# Patient Record
Sex: FEMALE | Race: WHITE | Employment: UNEMPLOYED | URBAN - METROPOLITAN AREA
[De-identification: names, ages, dates, MRNs, and addresses within clinical notes are randomized per-mention and may not be internally consistent; named-entity substitution may affect disease eponyms.]

---

## 2017-04-26 ENCOUNTER — OFFICE VISIT (OUTPATIENT)
Dept: MIDWIFE SERVICES | Age: 32
End: 2017-04-26

## 2017-04-26 VITALS
WEIGHT: 144 LBS | DIASTOLIC BLOOD PRESSURE: 63 MMHG | HEART RATE: 79 BPM | SYSTOLIC BLOOD PRESSURE: 99 MMHG | BODY MASS INDEX: 22.55 KG/M2

## 2017-04-26 DIAGNOSIS — R10.2 PELVIC PAIN IN FEMALE: Primary | ICD-10-CM

## 2017-04-26 NOTE — PROGRESS NOTES
Ramses Nichols presents for problem visit with pelvic pain with c/o pelvic discomfort and throbbing in internal pelvic area 3 and 4 days ago with internal discomfort when sitting with legs crossed and squatting. Denies Pain or throbbing for past 2 days. No menses for past 16 months due to pregnancy and child birth. Currently nursing. Did not have post partum visit. Had laceration with repair and denies any issues. No painful intercourse. Denies  fever, chills, bowel habits or constipation. Pelvic exam: VULVA: normal appearing vulva with no masses, tenderness or lesions, VAGINA: normal appearing vagina with normal color and discharge, no lesions, CERVIX: normal appearing cervix without discharge or lesions, UTERUS: uterus is normal size, shape, consistency and nontender, retroverted. ADNEXA: normal adnexa in size, nontender and no masses, RECTAL:rectal exam not indicated. Large amount of stool palpated through pelvic floor. A: Normal pelvic exam. No Pain or throbbing illicited with exam  Suspect constipation. Poor tone with kegel demonstration.     P: RTO in 2 weeks for  Well woman exam and pap smear  If pain/throbbing returns will order pelvic ultrasound  Increase oral fluid and good bowel routine to decrease constipation

## 2017-04-26 NOTE — PROGRESS NOTES
Chief Complaint   Patient presents with    Well Woman     Throbbing pain in pelvis. Visit Vitals    BP 99/63    Pulse 79    Wt 144 lb (65.3 kg)    Breastfeeding Yes    BMI 22.55 kg/m2     1. Have you been to the ER, urgent care clinic since your last visit? Hospitalized since your last visit? No    2. Have you seen or consulted any other health care providers outside of the [address] [place] [address] since your last visit? Include any pap smears or colon screening.  No

## 2017-04-26 NOTE — MR AVS SNAPSHOT
Visit Information Date & Time Provider Department Dept. Phone Encounter #  
 4/26/2017  3:00 PM Collette RidkaleKisha 6 [id number] Your Appointments 5/4/2017  3:30 PM  
PROBLEM VISIT with Collette Rideau, CNM [address] [facility] ([facility]) Appt Note: f/u  
 [address] [facility] [phone] [facility]  
[phone]  
  
   
 [address] [facility] [address] [address] Upcoming Health Maintenance Date Due  
 PAP AKA CERVICAL CYTOLOGY 7/23/2016 Allergies as of 4/26/2017  Review Complete On: 10/3/2016 By: Kristen Grijalva CNM No Known Allergies Current Immunizations  Never Reviewed Name Date Tdap 10/3/2016  9:35 AM  
  
 Not reviewed this visit Vitals BP Pulse Weight(growth percentile) Breastfeeding? BMI OB Status 99/63 79 144 lb (65.3 kg) Yes 22.55 kg/m2 Recent pregnancy Smoking Status Never Smoker BMI and BSA Data Body Mass Index Body Surface Area  
 22.55 kg/m 2 1.76 m 2 Preferred Pharmacy Pharmacy Name Phone [facility] [address] [facility] Your Updated Medication List  
  
   
This list is accurate as of: 4/26/17  3:49 PM.  Always use your most recent med list.  
  
  
  
  
 PRENATAL DHA+COMPLETE PRENATAL -300 mg-mcg-mg Cmpk Generic drug:  PNV no.24-iron-folic acid-dha Take  by mouth. PROBIOTIC 4X 10-15 mg Tbec Generic drug:  B.infantis-B.ani-B.long-B.bifi Take  by mouth. VITAMIN D2 50,000 unit capsule Generic drug:  ergocalciferol Take 50,000 Units by mouth. VITAMIN D3 1,000 unit Cap Generic drug:  cholecalciferol Take  by mouth. Introducing [facility]! [facility] introduces [employer] patient portal. Now you can access parts of your medical record, email your doctor's office, and request medication refills online. 1. In your internet browser, go to [email or web address] 2. Click on the First Time User? Click Here link in the Sign In box. You will see the New Member Sign Up page. 3. Enter your POWWOW Access Code exactly as it appears below. You will not need to use this code after youve completed the sign-up process. If you do not sign up before the expiration date, you must request a new code. · POWWOW Access Code: [email or web address] Expires: 7/25/2017  3:49 PM 
 
4. Enter the last four digits of your Social Security Number (xxxx) and Date of Birth (mm/dd/yyyy) as indicated and click Submit. You will be taken to the next sign-up page. 5. Create a Voddlert ID. This will be your POWWOW login ID and cannot be changed, so think of one that is secure and easy to remember. 6. Create a POWWOW password. You can change your password at any time. 7. Enter your Password Reset Question and Answer. This can be used at a later time if you forget your password. 8. Enter your e-mail address. You will receive e-mail notification when new information is available in [address] 9. Click Sign Up. You can now view and download portions of your medical record. 10. Click the Download Summary menu link to download a portable copy of your medical information. If you have questions, please visit the Frequently Asked Questions section of the POWWOW website. Remember, POWWOW is NOT to be used for urgent needs. For medical emergencies, dial 911. Now available from your iPhone and Android! Please provide this summary of care documentation to your next provider. Your primary care clinician is listed as NONE. If you have any questions after today's visit, please call [phone].

## 2017-05-04 ENCOUNTER — HOSPITAL ENCOUNTER (OUTPATIENT)
Dept: LAB | Age: 32
Discharge: HOME | End: 2017-05-04
Payer: COMMERCIAL

## 2017-05-04 ENCOUNTER — OFFICE VISIT (OUTPATIENT)
Dept: MIDWIFE SERVICES | Age: 32
End: 2017-05-04

## 2017-05-04 VITALS
DIASTOLIC BLOOD PRESSURE: 59 MMHG | HEART RATE: 76 BPM | BODY MASS INDEX: 22.34 KG/M2 | WEIGHT: 142.31 LBS | SYSTOLIC BLOOD PRESSURE: 103 MMHG | HEIGHT: 67 IN

## 2017-05-04 DIAGNOSIS — Z01.419 WELL WOMAN EXAM WITH ROUTINE GYNECOLOGICAL EXAM: Primary | ICD-10-CM

## 2017-05-04 PROCEDURE — 88175 CYTOPATH C/V AUTO FLUID REDO: CPT | Performed by: ADVANCED PRACTICE MIDWIFE

## 2017-05-04 NOTE — PROGRESS NOTES
Chief Complaint   Patient presents with    Well Woman     Visit Vitals    /59    Pulse 76    Ht 5' 7\" (1.702 m)    Wt 142 lb 5 oz (64.6 kg)    Breastfeeding Yes    BMI 22.29 kg/m2     1. Have you been to the ER, urgent care clinic since your last visit? Hospitalized since your last visit? No    2. Have you seen or consulted any other health care providers outside of the [address] [place] [address] since your last visit? Include any pap smears or colon screening.  No

## 2017-05-04 NOTE — PROGRESS NOTES
SUBJECTIVE:   [age] [facility] y.o. female for annual routine Pap and checkup. Current Outpatient Prescriptions   Medication Sig Dispense Refill    PNV no.24-iron-folic acid-dha (PRENATAL DHA+COMPLETE PRENATAL) -300 mg-mcg-mg cmpk Take  by mouth.  B.infantis-B.ani-B.long-B.bifi (PROBIOTIC 4X) 10-15 mg TbEC Take  by mouth.  Cholecalciferol, Vitamin D3, (VITAMIN D3) 1,000 unit cap Take  by mouth. Allergies: Review of patient's allergies indicates no known allergies. No LMP recorded. Patient is breast feeding. ROS:  Complete ROS completed, see second page of personal health questionnaire scanned in under media tab. Feeling well. No dyspnea or chest pain on exertion. No abdominal pain, change in bowel habits, black or bloody stools. No urinary tract symptoms. GYN ROS: No menses due to nursing 11 month old baby, no abnormal bleeding, pelvic pain or discharge, no breast pain or new or enlarging lumps on self exam. No neurological complaints. No monthly menses monthly. No bleeding between menses, or after intercourse. Experiences no dysmenorrhea when cycling since childbirth. Personal Health Questionnaire Reviewed: yes. Please see under chart review, media tab. History fully reviewed- see chart    See patient intake form for complete ROS, scanned in pt chart, under media tab. Age 5 to 33 yo, received HPV vaccine series, n/a.    USPFT recommendations: reviewed. BMI 30 kg/m2 or greater no. College bound no. Childbearing age yes. Hyperlipidemia or other known risk factors for cardiovascular and diet-related chronic disease no. Born between [facility] no. Age 8 to 24 yo and fair-skinned no. Age 15 to 24 yo no. Age 25 or older: yes. Over the past 2 weeks, have you felt down, depressed, or hopeless? no  Over the past 2 weeks, have you felt little interest or pleasure in doing things? no  Age 20 yo or older and at risk for coronary heart disease no. Age 35 yo or older no.   Age 47 yo or older no. Leida 53 yo to 79 yo no. Age 73 yo or older no. Family history of Breast Cancer no. Sexually active age up to age 26 yo or at high risk for STI no.  Sustained /80 or greater no. Tobacco use no. OBJECTIVE:   The patient appears well, alert, oriented x 3, in no distress. Visit Vitals    /59    Pulse 76    Ht 5' 7\" (1.702 m)    Wt 142 lb 5 oz (64.6 kg)    Breastfeeding Yes    BMI 22.29 kg/m2     Neck supple. No adenopathy or thyromegaly. Lungs are clear, good air entry, no wheezes, rhonchi or rales. S1 and S2 normal, no murmurs, regular rate and rhythm. Abdomen soft without tenderness, guarding, mass or organomegaly. Extremities show no edema. Neurological is normal, no focal findings. BREAST EXAM: breasts appear normal, no suspicious masses, no skin or nipple changes or axillary nodes, lactating, no erythema or tenderness, nipples normal    PELVIC EXAM: VULVA: normal appearing vulva with no masses, tenderness or lesions, VAGINA: normal appearing vagina with normal color and discharge, no lesions, CERVIX: normal appearing cervix without discharge or lesions, cervical motion tenderness absent, multiparous os, UTERUS: uterus is normal size, shape, consistency and nontender, anteverted, mobile, ADNEXA: normal adnexa in size, nontender and no masses, PAP: Pap smear done today, thin-prep method, HPV test   Pap smear collected    ASSESSMENT:   well woman    PLAN:  Counseled on birth control options and declines at this time. Pt instructed on heart health for women, calcium and vitamin D intake, and female cancers;   Return annually or prn

## 2019-06-13 ENCOUNTER — TELEPHONE (OUTPATIENT)
Dept: OBGYN CLINIC | Age: 34
End: 2019-06-13

## 2019-06-13 NOTE — TELEPHONE ENCOUNTER
1)  Checking to see if we ever carla cholesterol tests while she was pregnant- NO, not commonly run in pregnancy as a lab    2)  Checking to see if we can draw cholesterol for her. Advised to speak with [facility] when she comes in for her OV soon but typically a PCP usually does.

## 2019-06-18 ENCOUNTER — OFFICE VISIT (OUTPATIENT)
Dept: OBGYN CLINIC | Age: 34
End: 2019-06-18

## 2019-06-18 VITALS
BODY MASS INDEX: 22.76 KG/M2 | WEIGHT: 145 LBS | DIASTOLIC BLOOD PRESSURE: 80 MMHG | SYSTOLIC BLOOD PRESSURE: 118 MMHG | HEIGHT: 67 IN

## 2019-06-18 DIAGNOSIS — Z01.419 WELL WOMAN EXAM WITH ROUTINE GYNECOLOGICAL EXAM: ICD-10-CM

## 2019-06-18 DIAGNOSIS — N92.0 MENORRHAGIA WITH REGULAR CYCLE: Primary | ICD-10-CM

## 2019-06-18 NOTE — PROGRESS NOTES
Chief Complaint   Patient presents with    Well Woman     Visit Vitals  /80   Wt 145 lb (65.8 kg)   LMP 06/16/2019 (Exact Date)   Breastfeeding? Yes Comment: weaning process right now   BMI 22.71 kg/m²     1. Have you been to the ER, urgent care clinic since your last visit? Hospitalized since your last visit? No    2. Have you seen or consulted any other health care providers outside of the [address] [place] [address] since your last visit? Include any pap smears or colon screening. No     Here today for well woman exam.  She has no complaints or concerns today.

## 2019-06-18 NOTE — PROGRESS NOTES
Annual exam ages 21-44    Funmi Ching is a ,  27 y.o. female OTHER Patient's last menstrual period was 2019 (exact date). .    She presents for her annual checkup. She is having no significant problems except heavier cycles since starting cycles again since nursing after last baby. With regard to the Gardasil vaccine, she is older than the FDA approved age to receive it. Menstrual status:    Her periods are heavy in flow. She is using five to ten pads or tampons per day, usually regular and last 26-30 days. She has mild dysmenorrhea. She reports some mild premenstrual symptoms. Contraception:    The current method of family planning is condoms always. Sexual history:     She  has intercourse with males only. Medical conditions:    Since her last annual GYN exam about two years ago, she has not the following changes in her health history: none. Her brother was diagnosed with high cholesterol and she was told her's was high as a child and would like her's tested today. She has not been fasting and was told it might be a minimally higher with a non-fasting sample. Pap and Mammogram History:    Her most recent Pap smear was normal, obtained 2 year(s) ago. The patient has never had a mammogram.    Breast Cancer History:denies except in distant relative  Substance Abuse history denies    Past Medical History:   Diagnosis Date    Anemia     Postpartum depression     hormonal shift with weaning at 1 yoa     History reviewed. No pertinent surgical history. Allergies: Patient has no known allergies. Tobacco History:  reports that she has never smoked. She does not have any smokeless tobacco history on file. Alcohol Abuse:  reports that she does not drink alcohol. Drug Abuse:  reports that she does not use drugs.     Family Medical/Cancer History:   Family History   Problem Relation Age of Onset    Cancer Maternal Grandmother         Brain    Breast Cancer Other Great Aunt-Moms side Breast CAncer    Diabetes Paternal Grandfather         type 2    Heart Disease Paternal Grandfather     Heart Disease Maternal Grandfather         Review of Systems - History obtained from the patient  Constitutional: negative for weight loss, fever, night sweats  HEENT: negative for hearing loss, earache, congestion, snoring, sorethroat  CV: negative for chest pain, palpitations, edema  Resp: negative for cough, shortness of breath, wheezing  GI: negative for change in bowel habits, abdominal pain, black or bloody stools  : negative for frequency, dysuria, hematuria, vaginal discharge  MSK: negative for back pain, joint pain, muscle pain  Breast: negative for breast lumps, nipple discharge, galactorrhea  Skin :negative for itching, rash, hives  Neuro: negative for dizziness, headache, confusion, weakness  Psych: negative for anxiety, depression, change in mood  Heme/lymph: negative for bleeding, bruising, pallor    Physical Exam    Visit Vitals  /80   Ht 5' 7\" (1.702 m)   Wt 145 lb (65.8 kg)   LMP 06/16/2019 (Exact Date)   Breastfeeding? no Comment: weaning process right now   BMI 22.71 kg/m²       Constitutional  · Appearance: well-nourished, well developed, alert, in no acute distress    HENT  · Head and Face: appears normal    Neck  · Inspection/Palpation: normal appearance, no masses or tenderness  · Lymph Nodes: no lymphadenopathy present  · Thyroid: gland size normal, nontender, no nodules or masses present on palpation    Chest  · Respiratory Effort: breathing unlabored  · Auscultation: normal breath sounds    Cardiovascular  · Heart:  · Auscultation: regular rate and rhythm without murmur    Breasts  · Inspection of Breasts: breasts symmetrical, no skin changes, no discharge present, nipple appearance normal, no skin retraction present  · Palpation of Breasts and Axillae: no masses present on palpation, no breast tenderness  · Axillary Lymph Nodes: no lymphadenopathy present    Gastrointestinal  · Abdominal Examination: abdomen non-tender to palpation, normal bowel sounds, no masses present  · Liver and spleen: no hepatomegaly present, spleen not palpable  · Hernias: no hernias identified    Genitourinary  · External Genitalia: normal appearance for age, no discharge present, no tenderness present, no inflammatory lesions present, no masses present, no atrophy present  · Vagina: normal vaginal vault without central or paravaginal defects, no discharge present, no inflammatory lesions present, no masses present  · Bladder: non-tender to palpation  · Urethra: appears normal  · Cervix: normal   · Uterus: normal size, shape and consistency  · Adnexa: no adnexal tenderness present, no adnexal masses present  · Perineum: perineum within normal limits, no evidence of trauma, no rashes or skin lesions present  · Anus: anus within normal limits, no hemorrhoids present  · Inguinal Lymph Nodes: no lymphadenopathy present    Skin  · General Inspection: no rash, no lesions identified    Neurologic/Psychiatric  · Mental Status:  · Orientation: grossly oriented to person, place and time  · Mood and Affect: mood normal, affect appropriate    . Assessment:  Routine gynecologic examination  Her current medical status is satisfactory with no evidence of significant gynecologic issues.     Plan:  Counseled re: diet, exercise, healthy lifestyle  If college bound review need for vaccine updates including MMR and meningitis   Return for yearly wellness visits  Gardisil counseling provided  Pt counseled regarding co-testing for high risk HPV with pap  Rec screening mammo at  39  Orders Placed This Encounter    CBC W/O DIFF    LIPID PANEL    HGB A1C + [facility]

## 2019-06-20 LAB
1,5-ANHYDROGLUCITOL SERPL-MCNC: 11.5 UG/ML
CHOLEST SERPL-MCNC: 196 MG/DL (ref 100–199)
ERYTHROCYTE [DISTWIDTH] IN BLOOD BY AUTOMATED COUNT: 14.5 % (ref 12.3–15.4)
HBA1C MFR BLD: 5.5 %HB
HCT VFR BLD AUTO: 35.6 % (ref 34–46.6)
HDLC SERPL-MCNC: 46 MG/DL
HGB BLD-MCNC: 11.5 G/DL (ref 11.1–15.9)
LDLC SERPL CALC-MCNC: 131 MG/DL (ref 0–99)
MCH RBC QN AUTO: 25.6 PG (ref 26.6–33)
MCHC RBC AUTO-ENTMCNC: 32.3 G/DL (ref 31.5–35.7)
MCV RBC AUTO: 79 FL (ref 79–97)
PLATELET # BLD AUTO: 196 X10E3/UL (ref 150–450)
RBC # BLD AUTO: 4.5 X10E6/UL (ref 3.77–5.28)
TRIGL SERPL-MCNC: 95 MG/DL (ref 0–149)
VLDLC SERPL CALC-MCNC: 19 MG/DL (ref 5–40)
WBC # BLD AUTO: 4.7 X10E3/UL (ref 3.4–10.8)

## 2019-06-24 ENCOUNTER — TELEPHONE (OUTPATIENT)
Dept: OBGYN CLINIC | Age: 34
End: 2019-06-24

## 2019-06-24 NOTE — TELEPHONE ENCOUNTER
Calling to discuss lab results from 6/13/19    Result Notes for LIPID PANEL     Notes recorded by Brian Zhu CNM on 6/24/2019 at 10:28 AM EDT  Mailbox is full and cannot accept messages at this time. Cholesterol is good overall, can increase friuts and healthy fats and nuts in diet to lower LDL. CBC low normal. HGB A1c normal.     Patient has been advised and has no further questions.

## 2019-06-24 NOTE — PROGRESS NOTES
Mailbox is full and cannot accept messages at this time. Cholesterol is good overall, can increase friuts and healthy fats and nuts in diet to lower LDL.  CBC low normal. HGB A1c normal.

## 2019-06-28 NOTE — PROGRESS NOTES
Amena Colon was informed of results of labs and diet change recommendations made to improve lipid panel. Informed of CBC results with minimal anemia and option of increasing iron rich foods.  DM screening normal.

## 2019-11-12 PROBLEM — D64.9 ANEMIA: Status: ACTIVE | Noted: 2019-11-12

## 2019-11-12 NOTE — PROGRESS NOTES
History of Present Illness   Chief Complaint   Establish care    Brian Arzola is a 27 y.o. female     Has not been followed by PCP. Followed by midwife at OB/GYN for her deliveries    Patient presents to clinic for a several year history of feeling like she cannot completely void or defecate. Had 3 large babies 1 of which was more than 10 pounds. Feels like she has to place a lot of pressure in order to have a bowel movement and feels like she has to put pressure on her perineum. Reports that she does have a bowel movement every day and occasionally they are like lory. Also feels like she wakes up at night because she has not completely voided. Denies any fever, chills, night sweats, unexpected weight loss, changes in stool caliber. Denies any incontinence issues. Has been told before by her midwife that she does not have much tone and that she should use physical therapy for Kegel exercises and pelvic floor therapy. Reports that she could probably be better with her fluid intake. Denies any hematochezia, melena. Denies a history of UTIs. Review of Systems   Constitutional: Negative for chills and fever. HENT: Negative for hearing loss. Eyes: Negative for blurred vision. Respiratory: Negative for shortness of breath. Cardiovascular: Negative for chest pain. Gastrointestinal: Negative for abdominal pain, blood in stool, constipation, diarrhea, melena, nausea and vomiting. Genitourinary: Negative for dysuria and hematuria. Musculoskeletal: Negative for joint pain. Skin: Negative for rash. Neurological: Negative for headaches. Past Medical History   No Known Allergies     No current outpatient medications on file. No current facility-administered medications for this visit. Patient Active Problem List   Diagnosis Code    Vitamin D deficiency E55.9    Anemia D64.9    Postpartum depression O99.345, F53.0     History reviewed. No pertinent surgical history. Social History     Tobacco Use    Smoking status: Never Smoker    Smokeless tobacco: Never Used   Substance Use Topics    Alcohol use: Yes     Comment: rare a beer a month      Family History   Problem Relation Age of Onset    Elevated Lipids Brother     Other Brother         hashiomoto    Cancer Maternal Grandmother         Brain, glioblastoma    Breast Cancer Other         Great Aunt-Moms side Breast CAncer    Diabetes Paternal Grandfather         type 2    Heart Disease Paternal Grandfather     Stroke Paternal Grandfather     Dementia Paternal Grandfather     Heart Disease Maternal Grandfather         Physical Exam   Vitals:       Visit Vitals  /74 (BP 1 Location: Left arm, BP Patient Position: Sitting)   Pulse 69   Temp 98.4 °F (36.9 °C) (Oral)   Resp 18   Ht 5' 7\" (1.702 m)   Wt 151 lb (68.5 kg)   LMP 11/12/2019   SpO2 100%   BMI 23.65 kg/m²        Physical Exam   Constitutional: She is oriented to person, place, and time and well-developed, well-nourished, and in no distress. No distress. HENT:   Right Ear: External ear normal.   Left Ear: External ear normal.   Mouth/Throat: Oropharynx is clear and moist.   Eyes: Conjunctivae and EOM are normal.   Neck: Neck supple. No thyromegaly present. Cardiovascular: Normal rate, regular rhythm and intact distal pulses. Exam reveals no gallop and no friction rub. No murmur heard. Pulmonary/Chest: No respiratory distress. She has no wheezes. She has no rales. Abdominal: Soft. Bowel sounds are normal. She exhibits no distension. There is no hepatosplenomegaly. There is no tenderness. Lymphadenopathy:     She has no cervical adenopathy. Neurological: She is alert and oriented to person, place, and time. Skin: Skin is warm. No rash noted. Psychiatric: Affect and judgment normal.        Assessment and Plan   Diagnoses and all orders for this visit:    1. Routine adult health maintenance  Defer lab work to next visit.      2. Encounter for immunization  -     TETANUS, DIPHTHERIA TOXOIDS AND ACELLULAR PERTUSSIS VACCINE (TDAP), IN INDIVIDS. >=7, IM  -     ND IMMUNIZ ADMIN,1 SINGLE/COMB VAC/TOXOID    3. Constipation, unspecified constipation type  -     REFERRAL TO PHYSICAL THERAPY  Most likely cause of her symptoms. Encouraged increasing her fluid intake. Could try over-the-counter MiraLAX or senna as needed. Increase fiber intake. If her symptoms do not improve with these lifestyle changes, could consider physical therapy for pelvic floor training. If her symptoms are still not improved, consider GI or urology consult. Benefits, risks, possible drug interactions, and side effects of all new medications were reviewed with the patient. Pt verbalized understanding. Return to clinic: 6 months to follow-up on constipation    Evert Morfin MD  [facility]  11/13/2019    No future appointments.

## 2019-11-13 ENCOUNTER — OFFICE VISIT (OUTPATIENT)
Dept: INTERNAL MEDICINE CLINIC | Age: 34
End: 2019-11-13

## 2019-11-13 VITALS
RESPIRATION RATE: 18 BRPM | HEART RATE: 69 BPM | BODY MASS INDEX: 23.7 KG/M2 | HEIGHT: 67 IN | DIASTOLIC BLOOD PRESSURE: 74 MMHG | OXYGEN SATURATION: 100 % | WEIGHT: 151 LBS | SYSTOLIC BLOOD PRESSURE: 110 MMHG | TEMPERATURE: 98.4 F

## 2019-11-13 DIAGNOSIS — Z00.00 ROUTINE ADULT HEALTH MAINTENANCE: Primary | ICD-10-CM

## 2019-11-13 DIAGNOSIS — Z23 ENCOUNTER FOR IMMUNIZATION: ICD-10-CM

## 2019-11-13 DIAGNOSIS — K59.00 CONSTIPATION, UNSPECIFIED CONSTIPATION TYPE: ICD-10-CM

## 2019-11-13 NOTE — PROGRESS NOTES
Melanie Webber is a 27 y.o. female who presents for routine immunizations. She denies any symptoms , reactions or allergies that would exclude them from being immunized today. Risks and adverse reactions were discussed and the VIS was given to them. All questions were addressed. There were no reactions observed.     Willard Marvin LPN

## 2019-11-13 NOTE — PATIENT INSTRUCTIONS
Vaccine Information Statement Tdap (Tetanus, Diphtheria, Pertussis) Vaccine: What You Need to Know Many Vaccine Information Statements are available in [place] and other languages. See www.immunize.org/vis. Hojas de Información Sobre Vacunas están disponibles en español y en muchos otros idiomas. Visite [employer]ia[employer].si 1. Why get vaccinated? Tetanus, diphtheria, and pertussis are very serious diseases. Tdap vaccine can protect us from these diseases. And, Tdap vaccine given to pregnant women can protect  babies against pertussis. TETANUS (Lockjaw) is rare in the [facility] today. It causes painful muscle tightening and stiffness, usually all over the body. ? It can lead to tightening of muscles in the head and neck so you cant open your mouth, swallow, or sometimes even breathe. Tetanus kills about 1 out of 10 people who are infected even after receiving the best medical care. DIPHTHERIA is also rare in the [facility] today. It can cause a thick coating to form in the back of the throat. ? It can lead to breathing problems, heart failure, paralysis, and death. PERTUSSIS (Whooping Cough) causes severe coughing spells, which can cause difficulty breathing, vomiting, and disturbed sleep. ? It can also lead to weight loss, incontinence, and rib fractures. Up to 2 in 100 adolescents and 5 in 100 adults with pertussis are hospitalized or have complications, which could include pneumonia or death. These diseases are caused by bacteria. Diphtheria and pertussis are spread from person to person through secretions from coughing or sneezing. Tetanus enters the body through cuts, scratches, or wounds. Before vaccines, as many as 200,000 cases of diphtheria, 200,000 cases of pertussis, and hundreds of cases of tetanus, were reported in the [place] each year.  Since vaccination began, reports of cases for tetanus and diphtheria have dropped by about 99% and for pertussis by about 80%. 2. Tdap vaccine Tdap vaccine can protect adolescents and adults from tetanus, diphtheria, and pertussis. One dose of Tdap is routinely given at age 6 or 15. People who did not get Tdap at that age should get it as soon as possible. Tdap is especially important for health care professionals and anyone having close contact with a baby younger than 12 months. Pregnant women should get a dose of Tdap during every pregnancy, to protect the  from pertussis. Infants are most at risk for severe, life-threatening complications from pertussis. Another vaccine, called Td, protects against tetanus and diphtheria, but not pertussis. A Td booster should be given every 10 years. Tdap may be given as one of these boosters if you have never gotten Tdap before. Tdap may also be given after a severe cut or burn to prevent tetanus infection. Your doctor or the person giving you the vaccine can give you more information. Tdap may safely be given at the same time as other vaccines. 3. Some people should not get this vaccine  A person who has ever had a life-threatening allergic reaction after a previous dose of any diphtheria, tetanus or pertussis containing vaccine, OR has a severe allergy to any part of this vaccine, should not get Tdap vaccine. Tell the person giving the vaccine about any severe allergies.  Anyone who had coma or long repeated seizures within 7 days after a childhood dose of DTP or DTaP, or a previous dose of Tdap, should not get Tdap, unless a cause other than the vaccine was found. They can still get Td.  Talk to your doctor if you: 
- have seizures or another nervous system problem, 
- had severe pain or swelling after any vaccine containing diphtheria, tetanus or pertussis,  
- ever had a condition called Guillain Barré Syndrome (GBS), 
- arent feeling well on the day the shot is scheduled. 4. Risks With any medicine, including vaccines, there is a chance of side effects. These are usually mild and go away on their own. Serious reactions are also possible but are rare. Most people who get Tdap vaccine do not have any problems with it. Mild Problems following Tdap 
(Did not interfere with activities)  Pain where the shot was given (about 3 in 4 adolescents or 2 in 3 adults)  Redness or swelling where the shot was given (about 1 person in 5)  Mild fever of at least 100.4°F (up to about 1 in 25 adolescents or 1 in 100 adults)  Headache (about 3 or 4 people in 10)  Tiredness (about 1 person in 3 or 4)  Nausea, vomiting, diarrhea, stomach ache (up to 1 in 4 adolescents or 1 in 10 adults)  Chills,  sore joints (about 1 person in 10)  Body aches (about 1 person in 3 or 4)  Rash, swollen glands (uncommon) Moderate Problems following Tdap (Interfered with activities, but did not require medical attention)  Pain where the shot was given (up to 1 in 5 or 6)  Redness or swelling where the shot was given (up to about 1 in 16 adolescents or 1 in 12 adults)  Fever over 102°F (about 1 in 100 adolescents or 1 in 250 adults)  Headache (about 1 in 7 adolescents or 1 in 10 adults)  Nausea, vomiting, diarrhea, stomach ache (up to 1 or 3 people in 100)  Swelling of the entire arm where the shot was given (up to about 1 in 500). Severe Problems following Tdap 
(Unable to perform usual activities; required medical attention)  Swelling, severe pain, bleeding, and redness in the arm where the shot was given (rare). Problems that could happen after any vaccine:  People sometimes faint after a medical procedure, including vaccination. Sitting or lying down for about 15 minutes can help prevent fainting, and injuries caused by a fall. Tell your doctor if you feel dizzy, or have vision changes or ringing in the ears.  Some people get severe pain in the shoulder and have difficulty moving the arm where a shot was given. This happens very rarely.  Any medication can cause a severe allergic reaction. Such reactions from a vaccine are very rare, estimated at fewer than 1 in a million doses, and would happen within a few minutes to a few hours after the vaccination. As with any medicine, there is a very remote chance of a vaccine causing a serious injury or death. The safety of vaccines is always being monitored. For more information, visit: www.cdc.gov/vaccinesafety/ 
 
 
The [facility] Vaccine Injury Compensation Program (VICP) is a federal program that was created to compensate people who may have been injured by certain vaccines. Persons who believe they may have been injured by a vaccine can learn about the program and about filing a claim by calling 1-613.270.5351 or visiting the [employer] website at www.[email or web address]/vaccinecompensation.  There is a time limit to file a claim for compensation. 7. How can I learn more?  Ask your doctor. He or she can give you the vaccine package insert or suggest other sources of information.  Call your local or state health department.  Contact the Centers for Disease Control and Prevention (CDC): 
- Call 5-412.844.3586 (1-800-CDC-INFO) or 
- Visit CDCs website at www.cdc.gov/vaccines Vaccine Information Statement Tdap Vaccine 
(2/24/2015) [address] 953CM-00 Department of [facility] Centers for Disease Control and Prevention Office Use Only

## 2020-08-17 NOTE — PROGRESS NOTES
Chief Complaint   Vaginitis      HPI  38 y.o. female complains of itching and discomfort since her last period. Patient's last menstrual period was 08/08/2020 (exact date). Previous CNM pt, new to me. The patient  denies aggravating factors  She denies exposure to new chemicals ot hygenic agents  Previous treatment included:  [facility] and was treated with Diflucan (?) and told to follow up here. No STD concerns, declines screening. Sx started on 8/8 (small amt discharge, +itching). Was seen at [address] -- no pelvic done, tx'd for presumed yeast with diflucan. Was told to f/u gyn. Sx imporved -- discharge resolved, now only occ mild intermittent itching, nothing that she would have been concerned about. Period started later same day on 8/8. Past Medical History:   Diagnosis Date    Anemia     Postpartum depression     hormonal shift with weaning at 1 yoa    Postpartum depression 11/12/2019     History reviewed. No pertinent surgical history.   Social History     Occupational History    Occupation: Stay at home mom   Tobacco Use    Smoking status: Never Smoker    Smokeless tobacco: Never Used   Substance and Sexual Activity    Alcohol use: Yes     Comment: rare a beer a month    Drug use: No    Sexual activity: Yes     Partners: Male     Birth control/protection: Condom     Family History   Problem Relation Age of Onset    Elevated Lipids Brother     Other Brother         hashiomoto    Cancer Maternal Grandmother         Brain, glioblastoma    Breast Cancer Other         Great Aunt-Moms side Breast CAncer    Diabetes Paternal Grandfather         type 2    Heart Disease Paternal Grandfather     Stroke Paternal Grandfather     Dementia Paternal Grandfather     Heart Disease Maternal Grandfather         No Known Allergies  Prior to Admission medications    Not on File                      Review of Systems - History obtained from the patient  Constitutional: negative for weight loss, fever, night sweats  Breast: negative for breast lumps, nipple discharge, galactorrhea  GI: negative for change in bowel habits, abdominal pain, black or bloody stools  : negative for frequency, dysuria, hematuria  MSK: negative for back pain, joint pain, muscle pain  Skin: negative for itching, rash, hives  Neuro: negative for dizziness, headache, confusion, weakness  Psych: negative for anxiety, depression, change in mood  Heme/lymph: negative for bleeding, bruising, pallor       Objective:    Visit Vitals  /79 (BP 1 Location: Left arm, BP Patient Position: Sitting)   Ht 5' 7\" (1.702 m)   Wt 148 lb (67.1 kg)   LMP 08/08/2020 (Exact Date)   BMI 23.18 kg/m²       Physical Exam:   PHYSICAL EXAMINATION    Constitutional  · Appearance: well-nourished, well developed, alert, in no acute distress    HENT  · Head and Face: appears normal    Genitourinary  · External Genitalia: normal appearance for age, no discharge present, no tenderness present, no inflammatory lesions present, no masses present, no atrophy present  · Vagina:  no discharge present, otherwise normal vaginal vault without central or paravaginal defects, no inflammatory lesions present, no masses present  · Bladder: non-tender to palpation  · Urethra: appears normal  · Cervix: normal   · Uterus: normal size, shape and consistency, RV  · Adnexa: no adnexal tenderness present, no adnexal masses present  · Perineum: perineum within normal limits, no evidence of trauma, no rashes or skin lesions present  · Anus: anus within normal limits, no hemorrhoids present  · Inguinal Lymph Nodes: no lymphadenopathy present    Skin  · General Inspection: no rash, no lesions identified    Neurologic/Psychiatric  · Mental Status:  · Orientation: grossly oriented to person, place and time  · Mood and Affect: mood normal, affect appropriate        Results for orders placed or performed in visit on 08/18/20   AMB POC SMEAR, STAIN & INTERPRET, WET MOUNT Result Value Ref Range    Wet mount (POC) negative     Narrative    WP/TAMIKA    Hypae: negative  Buds: negative  Whiff: negative    Wet Prep:  Trich: negative  Clue cells: negative  Hyphae: negative  Buds: negative  WBC's: normal         Assessment:   Recent yeast infection -> resolved with tx    Plan:   RTO if sx recur.     Orders Placed This Encounter    AMB POC WET PREP (AKA STAIN, INTERPRET, WET MOUNT)

## 2020-08-18 ENCOUNTER — OFFICE VISIT (OUTPATIENT)
Dept: OBGYN CLINIC | Age: 35
End: 2020-08-18
Payer: COMMERCIAL

## 2020-08-18 VITALS
HEIGHT: 67 IN | BODY MASS INDEX: 23.23 KG/M2 | WEIGHT: 148 LBS | SYSTOLIC BLOOD PRESSURE: 123 MMHG | DIASTOLIC BLOOD PRESSURE: 79 MMHG

## 2020-08-18 DIAGNOSIS — N89.8 VAGINAL ITCHING: Primary | ICD-10-CM

## 2020-08-18 LAB — WET MOUNT POCT, WMPOCT: NEGATIVE

## 2020-08-18 PROCEDURE — 87210 SMEAR WET MOUNT SALINE/INK: CPT | Performed by: OBSTETRICS & GYNECOLOGY

## 2020-08-18 PROCEDURE — 99213 OFFICE O/P EST LOW 20 MIN: CPT | Performed by: OBSTETRICS & GYNECOLOGY

## 2020-08-20 LAB
A VAGINAE DNA VAG QL NAA+PROBE: NORMAL SCORE
BVAB2 DNA VAG QL NAA+PROBE: NORMAL SCORE
C ALBICANS DNA VAG QL NAA+PROBE: NEGATIVE
C GLABRATA DNA VAG QL NAA+PROBE: NEGATIVE
MEGA1 DNA VAG QL NAA+PROBE: NORMAL SCORE

## 2020-08-25 ENCOUNTER — TELEPHONE (OUTPATIENT)
Dept: OBGYN CLINIC | Age: 35
End: 2020-08-25

## 2020-08-25 NOTE — TELEPHONE ENCOUNTER
Patient of DM    Calling to say that she has a small bump in her labia area that is irritated and bothersome. She wanted to see if DM documented in her last visit about and I do not see of anything that mentions any type of bump. Advised if she was seen for a yeast follow up on 8/18/20 and she is still having discomfort even though DM thought the yeast had cleared and nothing was documented on this bump, she needs to be rechecked. She thanked me for my time.

## 2020-08-27 ENCOUNTER — OFFICE VISIT (OUTPATIENT)
Dept: OBGYN CLINIC | Facility: CLINIC | Age: 35
End: 2020-08-27

## 2020-08-27 VITALS
SYSTOLIC BLOOD PRESSURE: 110 MMHG | TEMPERATURE: 98.7 F | WEIGHT: 148 LBS | BODY MASS INDEX: 23.23 KG/M2 | HEART RATE: 90 BPM | DIASTOLIC BLOOD PRESSURE: 79 MMHG | HEIGHT: 67 IN

## 2020-08-27 DIAGNOSIS — N90.89 VULVAR LESION: Primary | ICD-10-CM

## 2020-08-27 PROCEDURE — 99202 OFFICE O/P NEW SF 15 MIN: CPT | Performed by: NURSE PRACTITIONER

## 2020-08-27 NOTE — PROGRESS NOTES
Assessment/Plan:    No problem-specific Assessment & Plan notes found for this encounter  Diagnoses and all orders for this visit:    Vulvar lesion-   recommend warm packs t i d  until resolved-   may also use bathtub  Which would also help her hemorrhoid  follow-up with her a [place] provider as needed          Subjective:      Patient ID: Sandra Garcia is a 38 y o  female  HPI  27-year-old  new patient with a vaginal sore  Patient is from [place] and heading back to [place] this weekend Patient reports had a pelvic exam at her gyn office last  week  After that appointment she noticed a little lump in the left labia  Patient was diagnosed with a yeast infection 2020- took  of fluconazole with resolution of symptoms  She states she followed up with her gyn doctor  because the urgent care she went to for her yeast infection does not perform pelvic exams  She reports this lump is not tender  Denies any vaginal discharge and yeast infection has resolved  uses condoms for contraception  LMP 2020    The following portions of the patient's history were reviewed and updated as appropriate: allergies, current medications, past family history, past medical history, past social history, past surgical history and problem list     Review of Systems   Constitutional: Negative for chills and fever  HENT: Negative for congestion  Respiratory: Negative for cough and shortness of breath  Cardiovascular: Negative  Genitourinary: Positive for genital sores  Negative for dysuria, menstrual problem, pelvic pain and vaginal discharge  Objective:      /79 (BP Location: Right arm, Patient Position: Sitting, Cuff Size: Adult)   Pulse 90   Temp 98 7 °F (37 1 °C) (Temporal)   Ht 5' 7" (1 702 m)   Wt 67 1 kg (148 lb)   LMP 2020   BMI 23 18 kg/m²          Physical Exam  Constitutional:       Appearance: Normal appearance     Cardiovascular:      Rate and Rhythm: Normal rate    Pulmonary:      Effort: Pulmonary effort is normal    Skin:     General: Skin is warm and dry  Neurological:      Mental Status: She is oriented to person, place, and time  Psychiatric:         Mood and Affect: Mood normal          Behavior: Behavior normal         External genitalia- left labial majora with very small pimple with head, no erythema,  Does not appear to be HSV   no other lesions noted  nonthrombosed hemorrhoid noted rectal area     patient declines pelvic exam

## 2022-03-19 PROBLEM — D64.9 ANEMIA: Status: ACTIVE | Noted: 2019-11-12

## 2022-03-21 ENCOUNTER — OFFICE VISIT (OUTPATIENT)
Dept: OBGYN CLINIC | Age: 37
End: 2022-03-21
Payer: COMMERCIAL

## 2022-03-21 VITALS
SYSTOLIC BLOOD PRESSURE: 126 MMHG | BODY MASS INDEX: 25.9 KG/M2 | HEIGHT: 67 IN | WEIGHT: 165 LBS | DIASTOLIC BLOOD PRESSURE: 82 MMHG

## 2022-03-21 DIAGNOSIS — N92.6 IRREGULAR PERIODS: ICD-10-CM

## 2022-03-21 DIAGNOSIS — R10.32 LLQ PAIN: Primary | ICD-10-CM

## 2022-03-21 DIAGNOSIS — R63.5 WEIGHT GAIN, ABNORMAL: ICD-10-CM

## 2022-03-21 LAB
T4 FREE SERPL-MCNC: 0.9 NG/DL (ref 0.8–1.5)
TSH SERPL DL<=0.05 MIU/L-ACNC: 1.74 UIU/ML (ref 0.36–3.74)

## 2022-03-21 PROCEDURE — 99214 OFFICE O/P EST MOD 30 MIN: CPT | Performed by: OBSTETRICS & GYNECOLOGY

## 2022-03-21 NOTE — PROGRESS NOTES
Problem Visit    Ailyn Simon is a 39 y.o.  presenting for problem visit. Her main concern today is occasional episodes of LLQ pain she experiences. Present for about 4-5 months. Sometimes occurs around ovulation, sometimes with her period, or with episodes of constipation. She is getting worried about this pain and wants to make sure it is nothing dangerous to her health. Also reports she had Covid a year ago last March, is concerned her thyroid may be disrupted from this. She has experienced weight gain and hair loss. She has gained almost 20 pounds since her last visit here. Menses are slightly irregular but occur monthly, typically every 22-23 days, but the shortest one has occurred 17 days apart. When she had children, her menses were every 28 days. She does not have a PCP. She admits to having health anxiety. Her sister is having a baby in May, she is a hospice nurse. Ob/Gyn Hx:   -  x 3, youngest 11years old  LMP - 3/8  Menses- irregular but they occur monthly, 7 days of bleeding  Contraception- none  SA- yes,       Past Medical History:   Diagnosis Date    Anemia     Postpartum depression     hormonal shift with weaning at 1 yoa    Postpartum depression 2019       No past surgical history on file.     Family History   Problem Relation Age of Onset    Elevated Lipids Brother     Other Brother         hashiomoto    Cancer Maternal Grandmother         Brain, glioblastoma    Breast Cancer Other         Great Aunt-Moms side Breast CAncer    Diabetes Paternal Grandfather         type 2    Heart Disease Paternal Grandfather     Stroke Paternal Grandfather     Dementia Paternal Grandfather     Heart Disease Maternal Grandfather        Social History     Socioeconomic History    Marital status:      Spouse name: Yomi Mauro Number of children: 1    Years of education: college    Highest education level: Not on file   Occupational History    Occupation: Stay at home mom   Tobacco Use    Smoking status: Never Smoker    Smokeless tobacco: Never Used   Substance and Sexual Activity    Alcohol use: Yes     Comment: rare a beer a month    Drug use: No    Sexual activity: Yes     Partners: Male     Birth control/protection: Condom   Other Topics Concern    Not on file   Social History Narrative    Not on file     Social Determinants of Health     Financial Resource Strain:     Difficulty of Paying Living Expenses: Not on file   Food Insecurity:     Worried About Running Out of Food in the Last Year: Not on file    Vincent of Food in the Last Year: Not on file   Transportation Needs:     Lack of Transportation (Medical): Not on file    Lack of Transportation (Non-Medical):  Not on file   Physical Activity:     Days of Exercise per Week: Not on file    Minutes of Exercise per Session: Not on file   Stress:     Feeling of Stress : Not on file   Social Connections:     Frequency of Communication with Friends and Family: Not on file    Frequency of Social Gatherings with Friends and Family: Not on file    Attends Orthodox Services: Not on file    Active Member of [address] [place] [address] or Organizations: Not on file    Attends Club or Organization Meetings: Not on file    Marital Status: Not on file   Intimate Partner Violence:     Fear of Current or Ex-Partner: Not on file    Emotionally Abused: Not on file    Physically Abused: Not on file    Sexually Abused: Not on file   Housing Stability:     Unable to Pay for Housing in the Last Year: Not on file    Number of [place] in the Last Year: Not on file    Unstable Housing in the Last Year: Not on file           No Known Allergies    Review of Systems - History obtained from the patient  Constitutional: negative for weight loss, fever, night sweats  HEENT: negative for hearing loss, earache, congestion, snoring, sorethroat  CV: negative for chest pain, palpitations, edema  Resp: negative for cough, shortness of breath, wheezing  GI: negative for change in bowel habits, abdominal pain, black or bloody stools  : negative for frequency, dysuria, hematuria, vaginal discharge  MSK: negative for back pain, joint pain, muscle pain  Breast: negative for breast lumps, nipple discharge, galactorrhea  Skin :negative for itching, rash, hives  Neuro: negative for dizziness, headache, confusion, weakness  Psych: negative for anxiety, depression, change in mood  Heme/lymph: negative for bleeding, bruising, pallor    Physical Exam    Visit Vitals  /82 (BP 1 Location: Left arm, BP Patient Position: Sitting)   Ht 5' 7\" (1.702 m)   Wt 165 lb (74.8 kg)   BMI 25.84 kg/m²         OBGyn Exam      Constitutional  · Appearance: well-nourished, well developed, alert, in no acute distress    HENT  · Head and Face: appears normal    Neck  · Inspection/Palpation: normal appearance, no masses or tenderness  · Thyroid: gland size normal, nontender    Chest  · Respiratory Effort: non-labored breathing    Cardiovascular  · Extremities: no peripheral edema    Gastrointestinal  · Abdominal Examination: abdomen non-distended, non-tender to palpation, no masses present  · Liver and spleen: no hepatomegaly present, spleen not palpable  · Hernias: no hernias identified      Skin  · General Inspection: no rash, no lesions identified    Neurologic/Psychiatric  · Mental Status:  · Orientation: grossly oriented to person, place and time  · Mood and Affect: mood normal, affect appropriate      Assessment/Plan:    1. Weight gain, abnormal  Discussed her 17 pound weight gain since her last OBGYN visit. Check thyroid labs given FH thyroid disorder. Also possibly due to post-COVID, stress, changing hormones.   - TSH 3RD GENERATION; Future  - T4, FREE; Future  - T4, FREE  - TSH 3RD GENERATION    2. LLQ pain  Discussed her symptoms in detail. She has a subacute LLQ pain, at times related to ovulation and at times not.    Check GYN ultrasound to evaluate for an ovarian cyst.      3. Irregular periods  Discussed may be hormonal vs normal variant. Sounds like she has a very short follicular phase. Check day 3 labs in the future. Offered OCPs for menstrual regulation and cyst suppression, which she is considering but also is considering attempting conception again.        Marie Mcclain MD

## 2022-04-07 ENCOUNTER — LAB ONLY (OUTPATIENT)
Dept: OBGYN CLINIC | Age: 37
End: 2022-04-07

## 2022-04-07 DIAGNOSIS — N92.6 IRREGULAR MENSES: Primary | ICD-10-CM

## 2022-04-07 LAB
FSH SERPL-ACNC: 5.8 MIU/ML
LH SERPL-ACNC: 2.7 MIU/ML
PROLACTIN SERPL-MCNC: 4.9 NG/ML

## 2022-04-08 LAB — ESTRADIOL SERPL-MCNC: 52.5 PG/ML

## 2022-04-14 LAB — MIS SERPL-MCNC: 0.76 NG/ML

## 2022-06-01 ENCOUNTER — OFFICE VISIT (OUTPATIENT)
Dept: OBGYN CLINIC | Age: 37
End: 2022-06-01

## 2022-06-01 VITALS
WEIGHT: 157 LBS | HEIGHT: 67 IN | BODY MASS INDEX: 24.64 KG/M2 | SYSTOLIC BLOOD PRESSURE: 122 MMHG | DIASTOLIC BLOOD PRESSURE: 84 MMHG

## 2022-06-01 DIAGNOSIS — Z01.419 ENCOUNTER FOR GYNECOLOGICAL EXAMINATION WITHOUT ABNORMAL FINDING: Primary | ICD-10-CM

## 2022-06-01 PROCEDURE — 99395 PREV VISIT EST AGE 18-39: CPT | Performed by: OBSTETRICS & GYNECOLOGY

## 2022-06-01 NOTE — PROGRESS NOTES
Annual exam    Annie Madrigal is a 44 y.o. presenting for annual exam. Her main concerns today include US follow up for occasional LLQ pain she experiences. She first reported this problem 3 months ago, at that time it had been occurring for about 4-5 months. Sometimes occurs around ovulation, sometimes with her period, or with episodes of constipation. Pain was present at the time of her last visit. Today she reports that period she had after last visit was very painful, she has not had a repeat of her LLQ pain since that time. 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 She declines a chaperone during the gynecologic exam today. US today:    TV ULTRASOUND  THE UTERUS IS ANTEVERTED, NORMAL IN SIZE, AND ECHOGENICITY. THE ENDOMETRIUM MEASURES 2.73 MM IN THICKNESS. NO MASSES OR ABNORMALITIES ARE SEEN. RIGHT OVARY APPEARS WNL. LEFT OVARY APPEARS WNL. NO FREE FLUID IS SEEN IN THE CDS. Ob/Gyn Hx:   -  x 3, youngest 11years old; all boys - patient homeschools  LMP -   Menses- irregular but they occur monthly, 7 days of bleeding  Contraception- none  STI - no  SA- yes,     Health maintenance:  Pap - 17 NIL  Gardasil -      Past Medical History:   Diagnosis Date    Anemia     Postpartum depression     hormonal shift with weaning at 1 yoa    Postpartum depression 2019       No past surgical history on file.     Family History   Problem Relation Age of Onset    Elevated Lipids Brother     Other Brother         hashiomoto    Cancer Maternal Grandmother         Brain, glioblastoma    Breast Cancer Other         Great Aunt-Moms side Breast CAncer    Diabetes Paternal Grandfather         type 2    Heart Disease Paternal Grandfather     Stroke Paternal Grandfather     Dementia Paternal Grandfather     Heart Disease Maternal Grandfather        Social History     Socioeconomic History    Marital status:      Spouse name: Monique Litter Number of children: 1    Years of education: college    Highest education level: Not on file   Occupational History    Occupation: Stay at home mom   Tobacco Use    Smoking status: Never Smoker    Smokeless tobacco: Never Used   Substance and Sexual Activity    Alcohol use: Yes     Comment: rare a beer a month    Drug use: No    Sexual activity: Yes     Partners: Male     Birth control/protection: Condom   Other Topics Concern    Not on file   Social History Narrative    Not on file     Social Determinants of Health     Financial Resource Strain:     Difficulty of Paying Living Expenses: Not on file   Food Insecurity:     Worried About Running Out of Food in the Last Year: Not on file    Vincent of Food in the Last Year: Not on file   Transportation Needs:     Lack of Transportation (Medical): Not on file    Lack of Transportation (Non-Medical):  Not on file   Physical Activity:     Days of Exercise per Week: Not on file    Minutes of Exercise per Session: Not on file   Stress:     Feeling of Stress : Not on file   Social Connections:     Frequency of Communication with Friends and Family: Not on file    Frequency of Social Gatherings with Friends and Family: Not on file    Attends Jainism Services: Not on file    Active Member of [address] [place] [address] or Organizations: Not on file    Attends Club or Organization Meetings: Not on file    Marital Status: Not on file   Intimate Partner Violence:     Fear of Current or Ex-Partner: Not on file    Emotionally Abused: Not on file    Physically Abused: Not on file    Sexually Abused: Not on file   Housing Stability:     Unable to Pay for Housing in the Last Year: Not on file    Number of [place] in the Last Year: Not on file    Unstable Housing in the Last Year: Not on file           No Known Allergies    Review of Systems - History obtained from the patient  Constitutional: negative for weight loss, fever, night sweats  HEENT: negative for hearing loss, earache, congestion, snoring, sorethroat  CV: negative for chest pain, palpitations, edema  Resp: negative for cough, shortness of breath, wheezing  GI: negative for change in bowel habits, abdominal pain, black or bloody stools  : negative for frequency, dysuria, hematuria, vaginal discharge  MSK: negative for back pain, joint pain, muscle pain  Breast: negative for breast lumps, nipple discharge, galactorrhea  Skin :negative for itching, rash, hives  Neuro: negative for dizziness, headache, confusion, weakness  Psych: negative for anxiety, depression, change in mood  Heme/lymph: negative for bleeding, bruising, pallor    Physical Exam    Visit Vitals  /84 (BP 1 Location: Left arm)   Ht 5' 7\" (1.702 m)   Wt 157 lb (71.2 kg)   LMP 05/25/2022   BMI 24.59 kg/m²       Constitutional  · Appearance: well-nourished, well developed, alert, in no acute distress    HENT  · Head and Face: appears normal    Neck  · Inspection/Palpation: normal appearance, no masses or tenderness  · Lymph Nodes: no lymphadenopathy present  · Thyroid: gland size normal, nontender, no nodules or masses present on palpation    Chest  · Respiratory Effort: non-labored breathing  · Auscultation: CTAB, normal breath sounds    Cardiovascular  · Heart:  · Auscultation: regular rate and rhythm without murmur  · Extremities: no peripheral edema    Breasts  · Inspection of Breasts: breasts symmetrical, no skin changes, no discharge present, nipple appearance normal, no skin retraction present  · Palpation of Breasts and Axillae: no masses present on palpation, no breast tenderness  · Axillary Lymph Nodes: no lymphadenopathy present    Gastrointestinal  · Abdominal Examination: abdomen non-tender to palpation, normal bowel sounds, no masses present  · Liver and spleen: no hepatomegaly present, spleen not palpable  · Hernias: no hernias identified    Genitourinary  · External Genitalia: normal appearance for age, no discharge present, no tenderness present, no inflammatory lesions present, no masses present, no atrophy present  · Vagina: normal vaginal vault without central or paravaginal defects, no discharge present, no inflammatory lesions present, no masses present  · Bladder: non-tender to palpation  · Urethra: appears normal  · Cervix: normal   · Uterus: normal size, shape and consistency, retroverted  · Adnexa: no adnexal tenderness present, no adnexal masses present  · Perineum: perineum within normal limits, no evidence of trauma, no rashes or skin lesions present    Skin  · General Inspection: no rash, no lesions identified    Neurologic/Psychiatric  · Mental Status:  · Orientation: grossly oriented to person, place and time  · Mood and Affect: mood normal, affect appropriate      Assessment/Plan:  44 y.o. presenting for annual exam. Overall doing well. Well woman exam:  Normal gynecologic and breast exams. Healthy habits and lifestyle reviewed. Pap with HPV performed today. Patient declines STD screening. Contraception and menstrual regulation - patient opts for none. Considering another pregnancy, but unsure at this time. Reviewed her US results and reassured re normal appearing GYN anatomy.      Octaviano Mauro MD

## 2022-06-01 NOTE — PATIENT INSTRUCTIONS
Well Visit, Ages 25 to 48: Care Instructions  Overview     Well visits can help you stay healthy. Your doctor has checked your overall health and may have suggested ways to take good care of yourself. Your doctor also may have recommended tests. At home, you can help prevent illness with healthy eating, regular exercise, and other steps. Follow-up care is a key part of your treatment and safety. Be sure to make and go to all appointments, and call your doctor if you are having problems. It's also a good idea to know your test results and keep a list of the medicines you take. How can you care for yourself at home? · Get screening tests that you and your doctor decide on. Screening helps find diseases before any symptoms appear. · Eat healthy foods. Choose fruits, vegetables, whole grains, protein, and low-fat dairy foods. Limit fat, especially saturated fat. Reduce salt in your diet. · Limit alcohol. If you are a man, have no more than 2 drinks a day or 14 drinks a week. If you are a woman, have no more than 1 drink a day or 7 drinks a week. · Get at least 30 minutes of physical activity on most days of the week. Walking is a good choice. You also may want to do other activities, such as running, swimming, cycling, or playing tennis or team sports. Discuss any changes in your exercise program with your doctor. · Reach and stay at a healthy weight. This will lower your risk for many problems, such as obesity, diabetes, heart disease, and high blood pressure. · Do not smoke or allow others to smoke around you. If you need help quitting, talk to your doctor about stop-smoking programs and medicines. These can increase your chances of quitting for good. · Care for your mental health. It is easy to get weighed down by worry and stress. Learn strategies to manage stress, like deep breathing and mindfulness, and stay connected with your family and community.  If you find you often feel sad or hopeless, talk with your doctor. Treatment can help. · Talk to your doctor about whether you have any risk factors for sexually transmitted infections (STIs). You can help prevent STIs if you wait to have sex with a new partner (or partners) until you've each been tested for STIs. It also helps if you use condoms (male or female condoms) and if you limit your sex partners to one person who only has sex with you. Vaccines are available for some STIs, such as HPV. · Use birth control if it's important to you to prevent pregnancy. Talk with your doctor about the choices available and what might be best for you. · If you think you may have a problem with alcohol or drug use, talk to your doctor. This includes prescription medicines (such as amphetamines and opioids) and illegal drugs (such as cocaine and methamphetamine). Your doctor can help you figure out what type of treatment is best for you. · Protect your skin from too much sun. When you're outdoors from 10 a.m. to 4 p.m., stay in the shade or cover up with clothing and a hat with a wide brim. Wear sunglasses that block UV rays. Even when it's cloudy, put broad-spectrum sunscreen (SPF 30 or higher) on any exposed skin. · See a dentist one or two times a year for checkups and to have your teeth cleaned. · Wear a seat belt in the car. When should you call for help? Watch closely for changes in your health, and be sure to contact your doctor if you have any problems or symptoms that concern you. Where can you learn more? Go to http://www.[facility].com/  Enter P072 in the search box to learn more about \"Well Visit, Ages 25 to 48: Care Instructions. \"  Current as of: October 6, 2021               Content Version: 13.2  © 1601-6224 [employer], [employer]. Care instructions adapted under license by [employer] (which disclaims liability or warranty for this information).  If you have questions about a medical condition or this instruction, always ask your healthcare professional. [place] [address] any warranty or liability for your use of this information.

## 2022-06-04 LAB
CYTOLOGIST CVX/VAG CYTO: NORMAL
CYTOLOGY CVX/VAG DOC CYTO: NORMAL
CYTOLOGY CVX/VAG DOC THIN PREP: NORMAL
DX ICD CODE: NORMAL
HPV I/H RISK 4 DNA CVX QL PROBE+SIG AMP: NEGATIVE
Lab: NORMAL
OTHER STN SPEC: NORMAL
STAT OF ADQ CVX/VAG CYTO-IMP: NORMAL

## 2023-07-17 ENCOUNTER — HOSPITAL ENCOUNTER (EMERGENCY)
Facility: HOSPITAL | Age: 38
Discharge: HOME | End: 2023-07-17
Attending: EMERGENCY MEDICINE
Payer: COMMERCIAL

## 2023-07-17 VITALS
SYSTOLIC BLOOD PRESSURE: 123 MMHG | WEIGHT: 168.21 LBS | HEIGHT: 67 IN | TEMPERATURE: 98.3 F | HEART RATE: 74 BPM | BODY MASS INDEX: 26.4 KG/M2 | OXYGEN SATURATION: 100 % | DIASTOLIC BLOOD PRESSURE: 89 MMHG | RESPIRATION RATE: 17 BRPM

## 2023-07-17 DIAGNOSIS — K64.4 INFLAMED EXTERNAL HEMORRHOID: Primary | ICD-10-CM

## 2023-07-17 PROCEDURE — 99283 EMERGENCY DEPT VISIT LOW MDM: CPT | Performed by: EMERGENCY MEDICINE

## 2023-07-17 RX ORDER — HYDROCORTISONE 25 MG/G
CREAM TOPICAL
Qty: 28 G | Refills: 2 | Status: SHIPPED | OUTPATIENT
Start: 2023-07-17

## 2023-07-17 RX ORDER — OXYCODONE HYDROCHLORIDE AND ACETAMINOPHEN 5; 325 MG/1; MG/1
1 TABLET ORAL EVERY 6 HOURS PRN
Qty: 12 TABLET | Refills: 0 | Status: SHIPPED | OUTPATIENT
Start: 2023-07-17 | End: 2023-07-20

## 2023-07-17 ASSESSMENT — PAIN DESCRIPTION - DESCRIPTORS: DESCRIPTORS: DISCOMFORT;DULL

## 2023-07-17 ASSESSMENT — PAIN SCALES - GENERAL: PAINLEVEL_OUTOF10: 2

## 2023-07-17 ASSESSMENT — PAIN DESCRIPTION - FREQUENCY: FREQUENCY: INTERMITTENT

## 2023-07-17 ASSESSMENT — PAIN DESCRIPTION - LOCATION: LOCATION: RECTUM

## 2023-07-17 ASSESSMENT — PAIN DESCRIPTION - PAIN TYPE: TYPE: ACUTE PAIN

## 2023-07-17 ASSESSMENT — PAIN - FUNCTIONAL ASSESSMENT: PAIN_FUNCTIONAL_ASSESSMENT: 0-10

## 2023-07-18 ASSESSMENT — ENCOUNTER SYMPTOMS
FACIAL SWELLING: 0
NAUSEA: 0
SORE THROAT: 0
CHEST TIGHTNESS: 0
EYE PAIN: 0
EYE DISCHARGE: 0
ABDOMINAL PAIN: 0
BACK PAIN: 0
HEMATOCHEZIA: 1
DIARRHEA: 0
SHORTNESS OF BREATH: 0
COUGH: 0
VOMITING: 0

## 2023-07-18 NOTE — ED PROVIDER NOTES
[facility] EMERGENCY DEP  EMERGENCY DEPARTMENT ENCOUNTER      Pt Name: Ana Valle  MRN: [id number]  [address] [facility] [place] 1985  Date of evaluation: 7/17/2023  Provider: Bartolo Carl MD    CHIEF COMPLAINT       Chief Complaint   Patient presents with    Hemorrhoids         HISTORY OF PRESENT ILLNESS   (Location/Symptom, Timing/Onset, Context/Setting, Quality, Duration, Modifying Factors, Severity)  Note limiting factors. 56-year-old female with no rectal bleeding but external hemorrhoid that is protruding. She is concerned for the possibility of a thrombosed hemorrhoid because she misunderstands that the blood clot is different than a DVT or PE. She is worried that if she gets a blood clot in her hemorrhoid that it might become a pulmonary embolus. She had hemorrhoids before. She never had surgery for hemorrhoids. She has some over-the-counter creams but not lately. The history is provided by the patient. Rectal Bleeding  Quality: None. Context: hemorrhoids    Context: not anal fissures, not anal penetration, not constipation, not defecation, not diarrhea, not rectal injury, not rectal pain and not spontaneously    Similar prior episodes: yes    Relieved by:  Nothing  Worsened by:  Nothing  Ineffective treatments:  Hemorrhoid cream  Associated symptoms: no abdominal pain, no dizziness, no epistaxis and no vomiting    Risk factors: no anticoagulant use        Review of External Medical Records:     Nursing Notes were reviewed. REVIEW OF SYSTEMS    (2-9 systems for level 4, 10 or more for level 5)     Review of Systems   Constitutional:  Negative for activity change, appetite change, chills and diaphoresis. HENT:  Negative for congestion, ear pain, facial swelling, nosebleeds and sore throat. Eyes:  Negative for pain, discharge and visual disturbance. Respiratory:  Negative for cough, chest tightness and shortness of breath. Cardiovascular:  Negative for chest pain and palpitations.

## 2023-07-18 NOTE — ED NOTES
Patient discharged by provider, discharge instructions provided to patient and reviewed, all questions answered. Vital signs stable, A/Ox4, breathing unlabored.  Patient ambulatory on discharge     Girish Gilliland RN  07/17/23 [id number]

## 2023-07-18 NOTE — ED TRIAGE NOTES
Pt reports having external hemmorhoid pain x 1 day. Hx of chronic constipation.   States more painful today and unable to sit